# Patient Record
Sex: FEMALE | URBAN - METROPOLITAN AREA
[De-identification: names, ages, dates, MRNs, and addresses within clinical notes are randomized per-mention and may not be internally consistent; named-entity substitution may affect disease eponyms.]

---

## 2023-04-17 ENCOUNTER — DOCUMENTATION ONLY (OUTPATIENT)
Dept: ALLERGY | Facility: CLINIC | Age: 28
End: 2023-04-17

## 2023-04-17 DIAGNOSIS — Z91.199 NO-SHOW FOR APPOINTMENT: Primary | ICD-10-CM

## 2023-04-17 NOTE — LETTER
April 17, 2023        Manjeet Leblanc MD  [address]  [address]  [place] [address]             [facility]  [address]  [address]  [place] [address]  Phone: [phone]  Fax: [phone]   Patient: Graham Donato   MR Number: [id number]   YOB: 1995   Date of Visit: 4/17/2023       Dear Dr. Leblanc:    Thank you for referring Graham Donato to me for evaluation. Below are the relevant portions of my assessment and plan of care.            If you have questions, please do not hesitate to call me. I look forward to following Graham along with you.    Sincerely,      Jessy Salas MD           CC  No Recipients

## 2023-10-02 ENCOUNTER — DOCUMENTATION ONLY (OUTPATIENT)
Dept: ALLERGY | Facility: CLINIC | Age: 28
End: 2023-10-02

## 2023-10-02 DIAGNOSIS — Z91.199 NO-SHOW FOR APPOINTMENT: Primary | ICD-10-CM

## 2023-10-02 NOTE — LETTER
October 2, 2023        Manjeet Leblanc MD  [address]  [address]  [place] [address]             [facility]  [address]  [address]  [place] [address]  Phone: [phone]  Fax: [phone]   Patient: Graham Donato   MR Number: [id number]   YOB: 1995   Date of Visit: 10/2/2023       Dear Dr. Leblanc:    Thank you for referring Graham Donato to me for evaluation. Below are the relevant portions of my assessment and plan of care.            If you have questions, please do not hesitate to call me. I look forward to following Graham along with you.    Sincerely,      Jessy Salas MD           CC  No Recipients